# Patient Record
Sex: FEMALE | Race: BLACK OR AFRICAN AMERICAN | Employment: UNEMPLOYED | URBAN - METROPOLITAN AREA
[De-identification: names, ages, dates, MRNs, and addresses within clinical notes are randomized per-mention and may not be internally consistent; named-entity substitution may affect disease eponyms.]

---
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
## 2025-04-10 NOTE — PROGRESS NOTES
prednisone to help relieve symptoms but patient declined. I have advised her to start over-the-counter antihistamine for symptom relief. I have also advised her to continue over-the-counter cortisone cream as it is helping her symptoms. I have advised her to make appointment with allergist for further evaluation and detailed allergy testing to find out the cause of her allergic dermatitis. Patient reports this is chronic and has been going on for past several months. Patient has been evaluated by another physician previously and had labs drawn in September. She does not have exact numbers of her hemoglobin at this time. She is currently on iron supplement. I have advised her to follow-up with PCP and get evaluated for easy bruisability. I explained we do not do work-up for chronic conditions and she will need to see a specialist for this. Patient is previously healthy and immunocompetent, presenting with symptoms suspicious for likely allergic dermatitis, chronic easy bruisability, chronic anemia. Differential includes gluten sensitivity, eczema flareup, contact dermatitis. Do not suspect underlying acute hematologic process. .  Patient is nontoxic appearing and not in need of emergent medical intervention. The patient is a good candidate for outpatient therapy based on normal PO intake, reassuring exam  and lack of distress upon discharge. Discharge decision based on the following:  clinical impression is consistent with outpatient treatment, patient's exam is stable and patient's condition is stable. I have discussed the results, diagnosis and treatment plan with the patient. The patient also understands that early in the process of an illness, an urgent care workup can be falsely reassuring.   Routine discharge counseling and specific return precautions discussed with patient and the patient understands that worsening, changing or persistent symptoms should prompt an immediate return
No